# Patient Record
Sex: FEMALE | Race: WHITE
[De-identification: names, ages, dates, MRNs, and addresses within clinical notes are randomized per-mention and may not be internally consistent; named-entity substitution may affect disease eponyms.]

---

## 2019-06-15 ENCOUNTER — HOSPITAL ENCOUNTER (OUTPATIENT)
Dept: HOSPITAL 76 - DI | Age: 79
Discharge: HOME | End: 2019-06-15
Attending: PHYSICIAN ASSISTANT
Payer: MEDICARE

## 2019-06-15 DIAGNOSIS — R60.0: Primary | ICD-10-CM

## 2019-06-15 PROCEDURE — 93306 TTE W/DOPPLER COMPLETE: CPT

## 2019-06-27 ENCOUNTER — HOSPITAL ENCOUNTER (OUTPATIENT)
Dept: HOSPITAL 76 - DI | Age: 79
Discharge: HOME | End: 2019-06-27
Attending: PHYSICIAN ASSISTANT
Payer: MEDICARE

## 2019-06-27 DIAGNOSIS — J90: ICD-10-CM

## 2019-06-27 DIAGNOSIS — R06.09: Primary | ICD-10-CM

## 2019-06-27 PROCEDURE — 71046 X-RAY EXAM CHEST 2 VIEWS: CPT

## 2019-06-27 NOTE — XRAY REPORT
Reason:  DYSPNEA ON EXERTION

Procedure Date:  06/27/2019   

Accession Number:  [id number] / [id number]                    

Procedure:  XR  - Chest 2 View X-Ray CPT Code:  78372

 

FULL RESULT:

 

 

EXAM:

CHEST RADIOGRAPHY

 

EXAM DATE: 6/27/2019 11:38 AM.

 

CLINICAL HISTORY: Dyspnea on exertion.

 

COMPARISON: XR CHEST PA AND LAT 02/04/2013 12:41 PM.

 

TECHNIQUE: 2 views.

 

FINDINGS:

Lungs/Pleura: There are bilateral small pleural effusions, new. No 

pneumothorax. Visualized portions of the aerated lungs are clear with the 

exception of a posterior right lower lobe area of consolidation best seen 

on lateral view. A new 1.1 cm nodular opacity in the lower left lung near 

the cardiac apex potentially represents the patient's nipple.

 

Mediastinum: The cardiomediastinal contour is stable including 

calcifications of the aortic arch.

 

Other: Surgical clips in the left breast are again seen.

IMPRESSION: New bilateral small pleural effusions with airspace disease 

at the posterior right lung base.

 

Nodular opacity in the left lower lung is new, possibly the patient's 

nipple. This could be clarified on follow-up imaging with a nipple marker.

 

[facility]

## 2019-08-06 ENCOUNTER — HOSPITAL ENCOUNTER (OUTPATIENT)
Dept: HOSPITAL 76 - DI | Age: 79
Discharge: HOME | End: 2019-08-06
Attending: PHYSICIAN ASSISTANT
Payer: MEDICARE

## 2019-08-06 DIAGNOSIS — J90: Primary | ICD-10-CM

## 2019-08-06 PROCEDURE — 71046 X-RAY EXAM CHEST 2 VIEWS: CPT

## 2019-08-07 NOTE — XRAY REPORT
Reason:  COUGH

Procedure Date:  08/06/2019   

Accession Number:  [id number] / [id number]                    

Procedure:  XR  - Chest 2 View X-Ray CPT Code:  22358

 

FULL RESULT:

 

 

EXAM:

CHEST RADIOGRAPHY

 

EXAM DATE: 8/6/2019 03:54 PM.

 

CLINICAL HISTORY: Cough for 2 weeks.

 

COMPARISON: CHEST 2 VIEW 06/27/2019 11:30 AM, ABDOMEN/PELVIS W/ 

07/23/2015 3:57 PM.

 

TECHNIQUE: 2 views.

 

FINDINGS:

Lungs/Pleura: Redemonstration of small bilateral pleural effusions right 

greater than left which may have increased slightly. Lung volumes have 

decreased compared to 06/27/2019. Interstitial markings are coarsened and 

diaphragms appear flattened through the effusion; potential obstructive 

lung disease underlying which is not definitely demonstrated in the lung 

bases on the 2015 abdominal CT. The previously measured left lower lobe 

lung nodule is identified as a breast nipple on today's exam. Surgical 

clips are again seen projecting over the left lung base, possibly prior 

lumpectomy. Aerated airspace demonstrates no lobar consolidation or overt 

pulmonary edema.

 

Mediastinum: The cardiomediastinal silhouette is stable. Borderline 

cardiomegaly and calcified aortic arch.

 

Other: None.

IMPRESSION: Interval increase in bilateral pleural effusions.

 

[facility]

## 2019-10-09 ENCOUNTER — HOSPITAL ENCOUNTER (OUTPATIENT)
Dept: HOSPITAL 76 - DI | Age: 79
Discharge: HOME | End: 2019-10-09
Attending: NURSE PRACTITIONER
Payer: MEDICARE

## 2019-10-09 DIAGNOSIS — R18.8: Primary | ICD-10-CM

## 2019-10-09 PROCEDURE — 49083 ABD PARACENTESIS W/IMAGING: CPT

## 2019-10-19 NOTE — ULTRASOUND REPORT
Reason:  OTHER ASCITES

Procedure Date:  10/09/2019   

Accession Number:  [id number] / [id number]                    

Procedure:  US  - Abdominal Paracentesis CPT Code:  

 

FULL RESULT:

 

 

EXAM:

ULTRASOUND-GUIDED PARACENTESIS

 

EXAM DATE: 10/9/2019 10:34 AM.

 

CLINICAL HISTORY: Other ascites.

 

COMPARISON: None.

 

TECHNIQUE: Risks, benefits, and alternatives to the procedure were 

discussed with the patient. All questions answered. Written and verbal 

consent obtained. Patient was placed in the supine position and the skin 

overlying the ascites marked with sonographic guidance. The skin was 

sterilely prepped and draped, and 1% buffered lidocaine was used for 

local anesthesia. An 18-gauge Angiocath was advanced into the peritoneal 

ascites and fluid aspirated. Upon completion, the catheter was removed.

 

FINDINGS: A total of 3.2 L  of fluid was removed without immediate 

complication. Patient tolerated procedure well.

IMPRESSION: Ultrasound-guided paracentesis without immediate 

complications.

 

[facility]

## 2019-10-23 ENCOUNTER — HOSPITAL ENCOUNTER (OUTPATIENT)
Dept: HOSPITAL 76 - DI | Age: 79
Discharge: HOME | End: 2019-10-23
Attending: NURSE PRACTITIONER
Payer: MEDICARE

## 2019-10-23 DIAGNOSIS — R18.8: Primary | ICD-10-CM

## 2019-10-23 PROCEDURE — 49083 ABD PARACENTESIS W/IMAGING: CPT

## 2019-10-23 NOTE — ULTRASOUND REPORT
Reason:  OTHER ASCITES

Procedure Date:  10/23/2019   

Accession Number:  [id number] / [id number]                    

Procedure:  US  - Abdominal Paracentesis CPT Code:  

 

FULL RESULT:

 

 

EXAM:

ULTRASOUND-GUIDED PARACENTESIS

 

EXAM DATE: 10/23/2019 02:08 PM.

 

CLINICAL HISTORY: Ascites, history of hepatitis C and liver cancer.

 

COMPARISON: ABDOMINAL PARACENTESIS 10/09/2019 10:34 AM.

 

TECHNIQUE: Risks, benefits, and alternatives to the procedure were 

discussed with the patient. All questions answered. Written and verbal 

consent obtained. Patient was placed in the supine position and the skin 

overlying the left lower quadrant ascites marked with sonographic 

guidance. The skin was sterilely prepped and draped, and 1% buffered 

lidocaine was used for local anesthesia. An 18-gauge Safe-T-Centesis 

Catheter was advanced into the peritoneal ascites and fluid aspirated. 

Upon completion, the catheter was removed.

 

FINDINGS: A total of 3 L of fluid was removed without immediate 

complication. Patient tolerated procedure well.

IMPRESSION: Ultrasound-guided paracentesis without immediate 

complications.

 

[facility]

## 2019-11-06 ENCOUNTER — HOSPITAL ENCOUNTER (OUTPATIENT)
Dept: HOSPITAL 76 - DI | Age: 79
Discharge: HOME | End: 2019-11-06
Attending: NURSE PRACTITIONER
Payer: MEDICARE

## 2019-11-06 DIAGNOSIS — R18.8: Primary | ICD-10-CM

## 2019-11-06 LAB
ANION GAP SERPL CALCULATED.4IONS-SCNC: 10 MMOL/L (ref 6–13)
BUN SERPL-MCNC: 55 MG/DL (ref 6–20)
CALCIUM UR-MCNC: 9.9 MG/DL (ref 8.5–10.3)
CHLORIDE SERPL-SCNC: 102 MMOL/L (ref 101–111)
CO2 SERPL-SCNC: 21 MMOL/L (ref 21–32)
CREAT SERPLBLD-SCNC: 2 MG/DL (ref 0.4–1)
GFRSERPLBLD MDRD-ARVRAT: 24 ML/MIN/{1.73_M2} (ref 89–?)
GLUCOSE SERPL-MCNC: 115 MG/DL (ref 70–100)
SODIUM SERPLBLD-SCNC: 133 MMOL/L (ref 135–145)

## 2019-11-06 PROCEDURE — 49083 ABD PARACENTESIS W/IMAGING: CPT

## 2019-11-06 PROCEDURE — 36415 COLL VENOUS BLD VENIPUNCTURE: CPT

## 2019-11-06 PROCEDURE — 80048 BASIC METABOLIC PNL TOTAL CA: CPT

## 2019-11-07 NOTE — ULTRASOUND REPORT
Reason:  OTHER ASCITES

Procedure Date:  11/06/2019   

Accession Number:  [id number] / [id number]                    

Procedure:  US  - Abdominal Paracentesis CPT Code:  

 

***Final Report***

 

 

FULL RESULT:

 

 

EXAM:

ULTRASOUND-GUIDED PARACENTESIS

 

EXAM DATE: 11/6/2019 01:20 PM.

 

CLINICAL HISTORY: Ascites.

 

COMPARISON: ABDOMINAL PARACENTESIS 10/23/2019 12:36 PM.

 

TECHNIQUE: Risks, benefits, and alternatives to the procedure were 

discussed with the patient. All questions answered. Written and verbal 

consent obtained. Patient was placed in the supine position and the skin 

overlying the ascites marked with sonographic guidance. The skin was 

sterilely prepped and draped, and 1% buffered lidocaine was used for 

local anesthesia. A 6-Tuvaluan drainer was advanced into the peritoneal 

ascites and fluid aspirated. Upon completion, the catheter was removed.

 

FINDINGS: A total of 4000 mL of fluid was removed without immediate 

complication. Patient tolerated procedure well.

IMPRESSION:

Ultrasound-guided paracentesis without immediate complications.

 

[facility]

## 2019-11-20 ENCOUNTER — HOSPITAL ENCOUNTER (OUTPATIENT)
Dept: HOSPITAL 76 - DI | Age: 79
Discharge: HOME | End: 2019-11-20
Attending: NURSE PRACTITIONER
Payer: MEDICARE

## 2019-11-20 DIAGNOSIS — K74.69: Primary | ICD-10-CM

## 2019-11-20 DIAGNOSIS — R18.8: ICD-10-CM

## 2019-11-20 LAB
ALBUMIN DIAFP-MCNC: 3.2 G/DL (ref 3.2–5.5)
ALBUMIN/GLOB SERPL: 0.8 {RATIO} (ref 1–2.2)
ALP SERPL-CCNC: 86 IU/L (ref 42–121)
ALT SERPL W P-5'-P-CCNC: 29 IU/L (ref 10–60)
ANION GAP SERPL CALCULATED.4IONS-SCNC: 9 MMOL/L (ref 6–13)
APTT PPP: 37.7 SECS (ref 24.9–33.3)
AST SERPL W P-5'-P-CCNC: 72 IU/L (ref 10–42)
BASOPHILS NFR BLD AUTO: 0 10^3/UL (ref 0–0.1)
BASOPHILS NFR BLD AUTO: 0.6 %
BILIRUB BLD-MCNC: 12.6 MG/DL (ref 0.2–1)
BUN SERPL-MCNC: 55 MG/DL (ref 6–20)
CALCIUM UR-MCNC: 10.4 MG/DL (ref 8.5–10.3)
CHLORIDE SERPL-SCNC: 102 MMOL/L (ref 101–111)
CO2 SERPL-SCNC: 20 MMOL/L (ref 21–32)
CREAT SERPLBLD-SCNC: 2.4 MG/DL (ref 0.4–1)
EOSINOPHIL # BLD AUTO: 0.1 10^3/UL (ref 0–0.7)
EOSINOPHIL NFR BLD AUTO: 1.3 %
ERYTHROCYTE [DISTWIDTH] IN BLOOD BY AUTOMATED COUNT: 17.1 % (ref 12–15)
GFRSERPLBLD MDRD-ARVRAT: 19 ML/MIN/{1.73_M2} (ref 89–?)
GLOBULIN SER-MCNC: 3.8 G/DL (ref 2.1–4.2)
GLUCOSE SERPL-MCNC: 117 MG/DL (ref 70–100)
HGB UR QL STRIP: 9.2 G/DL (ref 12–16)
INR PPP: 1.7 (ref 0.8–1.2)
LYMPHOCYTES # SPEC AUTO: 0.4 10^3/UL (ref 1.5–3.5)
LYMPHOCYTES NFR BLD AUTO: 5.7 %
MCH RBC QN AUTO: 39.3 PG (ref 27–31)
MCHC RBC AUTO-ENTMCNC: 33.9 G/DL (ref 32–36)
MCV RBC AUTO: 115.8 FL (ref 81–99)
MONOCYTES # BLD AUTO: 0.8 10^3/UL (ref 0–1)
MONOCYTES NFR BLD AUTO: 11.4 %
NEUTROPHILS # BLD AUTO: 5.6 10^3/UL (ref 1.5–6.6)
NEUTROPHILS # SNV AUTO: 7 X10^3/UL (ref 4.8–10.8)
NEUTROPHILS NFR BLD AUTO: 80.4 %
PDW BLD AUTO: 11.4 FL (ref 7.9–10.8)
PLATELET # BLD: 91 10^3/UL (ref 130–450)
PROT SPEC-MCNC: 7 G/DL (ref 6.7–8.2)
PROTHROM ACT/NOR PPP: 18.3 SECS (ref 9.9–12.6)
RBC MAR: 2.34 10^6/UL (ref 4.2–5.4)
SODIUM SERPLBLD-SCNC: 131 MMOL/L (ref 135–145)

## 2019-11-20 PROCEDURE — 80048 BASIC METABOLIC PNL TOTAL CA: CPT

## 2019-11-20 PROCEDURE — 85025 COMPLETE CBC W/AUTO DIFF WBC: CPT

## 2019-11-20 PROCEDURE — 85610 PROTHROMBIN TIME: CPT

## 2019-11-20 PROCEDURE — 85730 THROMBOPLASTIN TIME PARTIAL: CPT

## 2019-11-20 PROCEDURE — 36415 COLL VENOUS BLD VENIPUNCTURE: CPT

## 2019-11-20 PROCEDURE — 49083 ABD PARACENTESIS W/IMAGING: CPT

## 2019-11-20 PROCEDURE — 80053 COMPREHEN METABOLIC PANEL: CPT

## 2019-11-21 NOTE — ULTRASOUND REPORT
Reason:  OTHER ASCITES

Procedure Date:  11/20/2019   

Accession Number:  [id number] / [id number]                    

Procedure:  US  - Abdominal Paracentesis CPT Code:  

 

***Final Report***

 

 

FULL RESULT:

 

 

EXAM:

ULTRASOUND-GUIDED PARACENTESIS

 

EXAM DATE: 11/20/2019 01:45 PM.

 

CLINICAL HISTORY: Other ascites.

 

COMPARISON: ABDOMINAL PARACENTESIS 11/06/2019 11:25 AM.

 

TECHNIQUE: Risks, benefits, and alternatives to the procedure were 

discussed with the patient. All questions answered. Written and verbal 

consent obtained. Patient was placed in the supine position and the skin 

overlying the ascites marked with sonographic guidance. The skin was 

sterilely prepped and draped, and 1% buffered lidocaine was used for 

local anesthesia. A 6 Persian drainer catheter was advanced into the 

peritoneal ascites and fluid aspirated. Upon completion, the catheter was 

removed.

 

FINDINGS: A total of 3100 mL of fluid was removed without immediate 

complication. Patient tolerated procedure well.

IMPRESSION: Ultrasound-guided paracentesis without immediate 

complications.

 

[facility]

## 2019-11-26 ENCOUNTER — HOSPITAL ENCOUNTER (OUTPATIENT)
Dept: HOSPITAL 76 - DI | Age: 79
Discharge: HOME | End: 2019-11-26
Attending: NURSE PRACTITIONER
Payer: MEDICARE

## 2019-11-26 DIAGNOSIS — R18.8: Primary | ICD-10-CM

## 2019-11-26 PROCEDURE — 49083 ABD PARACENTESIS W/IMAGING: CPT

## 2019-11-26 NOTE — ULTRASOUND REPORT
Reason:  OTHER ASCITES

Procedure Date:  11/26/2019   

Accession Number:  [id number] / [id number]                    

Procedure:  US  - Abdominal Paracentesis CPT Code:  

 

***Final Report***

 

 

FULL RESULT:

 

 

EXAM:

ULTRASOUND-GUIDED PARACENTESIS

 

EXAM DATE: 11/26/2019 02:17 PM.

 

CLINICAL HISTORY: Other ascites.

 

COMPARISON: ABDOMINAL PARACENTESIS 11/20/2019 11:44 AM.

 

TECHNIQUE: Risks, benefits, and alternatives to the procedure were 

discussed with the patient. All questions answered. Written and verbal 

consent obtained. Patient was placed in the supine position and the skin 

overlying the ascites marked with sonographic guidance. The skin was 

sterilely prepped and draped, and 1% buffered lidocaine was used for 

local anesthesia. A 4 Spanish needle catheter combination was advanced 

into the peritoneal ascites and fluid aspirated. Upon completion, the 

catheter was removed.

 

FINDINGS: A total of 4000 mL of fluid was removed without immediate 

complication. Patient tolerated procedure well.

IMPRESSION: Ultrasound-guided paracentesis without immediate 

complications.

 

[facility]

## 2019-12-04 ENCOUNTER — HOSPITAL ENCOUNTER (OUTPATIENT)
Dept: HOSPITAL 76 - LAB | Age: 79
Discharge: HOME | End: 2019-12-04
Attending: PHYSICIAN ASSISTANT
Payer: MEDICARE

## 2019-12-04 ENCOUNTER — HOSPITAL ENCOUNTER (OUTPATIENT)
Dept: HOSPITAL 76 - DI | Age: 79
Discharge: HOME | End: 2019-12-04
Attending: NURSE PRACTITIONER
Payer: MEDICARE

## 2019-12-04 DIAGNOSIS — C22.0: Primary | ICD-10-CM

## 2019-12-04 DIAGNOSIS — R18.8: Primary | ICD-10-CM

## 2019-12-04 LAB
ALBUMIN DIAFP-MCNC: 5 G/DL (ref 3.2–5.5)
ALBUMIN/GLOB SERPL: 2.3 {RATIO} (ref 1–2.2)
ALP SERPL-CCNC: 92 IU/L (ref 42–121)
ALT SERPL W P-5'-P-CCNC: 24 IU/L (ref 10–60)
ANION GAP SERPL CALCULATED.4IONS-SCNC: 14 MMOL/L (ref 6–13)
AST SERPL W P-5'-P-CCNC: 66 IU/L (ref 10–42)
BASOPHILS NFR BLD AUTO: 0 10^3/UL (ref 0–0.1)
BASOPHILS NFR BLD AUTO: 0.6 %
BILIRUB BLD-MCNC: 8.8 MG/DL (ref 0.2–1)
BUN SERPL-MCNC: 56 MG/DL (ref 6–20)
CALCIUM UR-MCNC: 10.6 MG/DL (ref 8.5–10.3)
CHLORIDE SERPL-SCNC: 99 MMOL/L (ref 101–111)
CO2 SERPL-SCNC: 17 MMOL/L (ref 21–32)
CREAT SERPLBLD-SCNC: 2.4 MG/DL (ref 0.4–1)
EOSINOPHIL # BLD AUTO: 0.1 10^3/UL (ref 0–0.7)
EOSINOPHIL NFR BLD AUTO: 0.9 %
ERYTHROCYTE [DISTWIDTH] IN BLOOD BY AUTOMATED COUNT: 17.4 % (ref 12–15)
GFRSERPLBLD MDRD-ARVRAT: 19 ML/MIN/{1.73_M2} (ref 89–?)
GLOBULIN SER-MCNC: 2.2 G/DL (ref 2.1–4.2)
GLUCOSE SERPL-MCNC: 134 MG/DL (ref 70–100)
HGB UR QL STRIP: 7 G/DL (ref 12–16)
INR PPP: 2 (ref 0.8–1.2)
LYMPHOCYTES # SPEC AUTO: 0.4 10^3/UL (ref 1.5–3.5)
LYMPHOCYTES NFR BLD AUTO: 6.7 %
MCH RBC QN AUTO: 37.6 PG (ref 27–31)
MCHC RBC AUTO-ENTMCNC: 32.7 G/DL (ref 32–36)
MCV RBC AUTO: 115.1 FL (ref 81–99)
MONOCYTES # BLD AUTO: 0.7 10^3/UL (ref 0–1)
MONOCYTES NFR BLD AUTO: 13.7 %
NEUTROPHILS # BLD AUTO: 4.2 10^3/UL (ref 1.5–6.6)
NEUTROPHILS # SNV AUTO: 5.4 X10^3/UL (ref 4.8–10.8)
NEUTROPHILS NFR BLD AUTO: 77.4 %
PDW BLD AUTO: 11.2 FL (ref 7.9–10.8)
PLAT MORPH BLD: (no result)
PLATELET # BLD: 61 10^3/UL (ref 130–450)
PLATELET BLD QL SMEAR: (no result)
PROT SPEC-MCNC: 7.2 G/DL (ref 6.7–8.2)
PROTHROM ACT/NOR PPP: 22.1 SECS (ref 9.9–12.6)
RBC MAR: 1.86 10^6/UL (ref 4.2–5.4)
RBC MORPH BLD: (no result)
SODIUM SERPLBLD-SCNC: 130 MMOL/L (ref 135–145)

## 2019-12-04 PROCEDURE — 80053 COMPREHEN METABOLIC PANEL: CPT

## 2019-12-04 PROCEDURE — 49083 ABD PARACENTESIS W/IMAGING: CPT

## 2019-12-04 PROCEDURE — 85025 COMPLETE CBC W/AUTO DIFF WBC: CPT

## 2019-12-04 PROCEDURE — 85610 PROTHROMBIN TIME: CPT

## 2019-12-04 PROCEDURE — 36415 COLL VENOUS BLD VENIPUNCTURE: CPT

## 2019-12-04 NOTE — ULTRASOUND REPORT
Reason:  OTHER ASCITES

Procedure Date:  12/04/2019   

Accession Number:  [id number] / [id number]                    

Procedure:  US  - Abdominal Paracentesis CPT Code:  

 

***Final Report***

 

 

FULL RESULT:

 

 

EXAM: Abdominal Paracentesis

 

DATE: 12/4/2019 1:35 PM

 

CLINICAL HISTORY: Ascites. Therapeutic drainage requested.

 

FINDINGS:

Following obtaining informed consent, a suitable site in the patient's 

abdomen was selected with ultrasound. The skin was prepped and draped in 

the usual sterile fashion. The skin and soft tissues were anesthetized 

with buffered lidocaine. A SafeTcentesis catheter was inserted into the 

peritoneal cavity, and approximately 20 mL of fluid was removed without 

difficulty.

 

Trace residual ascites present in the abdomen at the completion of the 

procedure.

 

The patient tolerated the procedure well. No immediate complications.

 

IMPRESSION:

Successful ultrasound-guided paracentesis, yielding approximately 20 mL 

of fluid.

## 2019-12-05 ENCOUNTER — HOSPITAL ENCOUNTER (EMERGENCY)
Dept: HOSPITAL 76 - ED | Age: 79
Discharge: HOME | End: 2019-12-05
Payer: MEDICARE

## 2019-12-05 VITALS — DIASTOLIC BLOOD PRESSURE: 51 MMHG | SYSTOLIC BLOOD PRESSURE: 109 MMHG

## 2019-12-05 DIAGNOSIS — B19.20: ICD-10-CM

## 2019-12-05 DIAGNOSIS — I10: ICD-10-CM

## 2019-12-05 DIAGNOSIS — R01.1: ICD-10-CM

## 2019-12-05 DIAGNOSIS — E87.1: ICD-10-CM

## 2019-12-05 DIAGNOSIS — E87.5: Primary | ICD-10-CM

## 2019-12-05 DIAGNOSIS — R06.02: ICD-10-CM

## 2019-12-05 DIAGNOSIS — D64.9: ICD-10-CM

## 2019-12-05 DIAGNOSIS — K76.7: ICD-10-CM

## 2019-12-05 DIAGNOSIS — K74.69: ICD-10-CM

## 2019-12-05 LAB
ALBUMIN DIAFP-MCNC: 4.5 G/DL (ref 3.2–5.5)
ALBUMIN/GLOB SERPL: 1.7 {RATIO} (ref 1–2.2)
ALP SERPL-CCNC: 105 IU/L (ref 42–121)
ALT SERPL W P-5'-P-CCNC: 27 IU/L (ref 10–60)
ANION GAP SERPL CALCULATED.4IONS-SCNC: 14 MMOL/L (ref 6–13)
AST SERPL W P-5'-P-CCNC: 75 IU/L (ref 10–42)
BASOPHILS NFR BLD AUTO: 0 10^3/UL (ref 0–0.1)
BASOPHILS NFR BLD AUTO: 0.6 %
BILIRUB BLD-MCNC: 10.8 MG/DL (ref 0.2–1)
BUN SERPL-MCNC: 56 MG/DL (ref 6–20)
CALCIUM UR-MCNC: 10.7 MG/DL (ref 8.5–10.3)
CHLORIDE SERPL-SCNC: 99 MMOL/L (ref 101–111)
CO2 SERPL-SCNC: 18 MMOL/L (ref 21–32)
CREAT SERPLBLD-SCNC: 2.3 MG/DL (ref 0.4–1)
EOSINOPHIL # BLD AUTO: 0.1 10^3/UL (ref 0–0.7)
EOSINOPHIL NFR BLD AUTO: 1.4 %
ERYTHROCYTE [DISTWIDTH] IN BLOOD BY AUTOMATED COUNT: 17.5 % (ref 12–15)
GFRSERPLBLD MDRD-ARVRAT: 20 ML/MIN/{1.73_M2} (ref 89–?)
GLOBULIN SER-MCNC: 2.6 G/DL (ref 2.1–4.2)
GLUCOSE SERPL-MCNC: 108 MG/DL (ref 70–100)
HGB UR QL STRIP: 8.2 G/DL (ref 12–16)
LIPASE SERPL-CCNC: 50 U/L (ref 22–51)
LYMPHOCYTES # SPEC AUTO: 0.4 10^3/UL (ref 1.5–3.5)
LYMPHOCYTES NFR BLD AUTO: 5.8 %
MAGNESIUM SERPL-MCNC: 2.4 MG/DL (ref 1.7–2.8)
MCH RBC QN AUTO: 38.1 PG (ref 27–31)
MCHC RBC AUTO-ENTMCNC: 33.5 G/DL (ref 32–36)
MCV RBC AUTO: 114 FL (ref 81–99)
MONOCYTES # BLD AUTO: 0.7 10^3/UL (ref 0–1)
MONOCYTES NFR BLD AUTO: 10.7 %
NEUTROPHILS # BLD AUTO: 5.3 10^3/UL (ref 1.5–6.6)
NEUTROPHILS # SNV AUTO: 6.6 X10^3/UL (ref 4.8–10.8)
NEUTROPHILS NFR BLD AUTO: 81 %
PDW BLD AUTO: 12.1 FL (ref 7.9–10.8)
PLATELET # BLD: 75 10^3/UL (ref 130–450)
PROT SPEC-MCNC: 7.1 G/DL (ref 6.7–8.2)
RBC MAR: 2.15 10^6/UL (ref 4.2–5.4)
SODIUM SERPLBLD-SCNC: 131 MMOL/L (ref 135–145)

## 2019-12-05 PROCEDURE — 83690 ASSAY OF LIPASE: CPT

## 2019-12-05 PROCEDURE — 36415 COLL VENOUS BLD VENIPUNCTURE: CPT

## 2019-12-05 PROCEDURE — 86900 BLOOD TYPING SEROLOGIC ABO: CPT

## 2019-12-05 PROCEDURE — 82272 OCCULT BLD FECES 1-3 TESTS: CPT

## 2019-12-05 PROCEDURE — 86850 RBC ANTIBODY SCREEN: CPT

## 2019-12-05 PROCEDURE — 80053 COMPREHEN METABOLIC PANEL: CPT

## 2019-12-05 PROCEDURE — 96374 THER/PROPH/DIAG INJ IV PUSH: CPT

## 2019-12-05 PROCEDURE — 99285 EMERGENCY DEPT VISIT HI MDM: CPT

## 2019-12-05 PROCEDURE — 85025 COMPLETE CBC W/AUTO DIFF WBC: CPT

## 2019-12-05 PROCEDURE — 83735 ASSAY OF MAGNESIUM: CPT

## 2019-12-05 PROCEDURE — 86901 BLOOD TYPING SEROLOGIC RH(D): CPT

## 2019-12-05 PROCEDURE — 36430 TRANSFUSION BLD/BLD COMPNT: CPT

## 2019-12-05 PROCEDURE — 99284 EMERGENCY DEPT VISIT MOD MDM: CPT

## 2019-12-05 PROCEDURE — 83880 ASSAY OF NATRIURETIC PEPTIDE: CPT

## 2019-12-05 PROCEDURE — 86920 COMPATIBILITY TEST SPIN: CPT

## 2019-12-05 NOTE — ED PHYSICIAN DOCUMENTATION
History of Present Illness





- Stated complaint


Stated Complaint: WEAKNESS





- Chief complaint


Chief Complaint: General





- History obtained from


History obtained from: Patient, Family





- History of Present Illness


Timing: Other (She has longstanding cirrhosis from hepatitis C that she received

work during a blood transfusion when she was 29.  She gets weekly paracenteses. 

She had routine blood work done yesterday showing new anemia.  She feels weak.  

She got her paracentesis yesterday and she usually feels better after the 

paracentesis from her perspective of weakness and shortness of breath, but still

feels very weak and tired despite getting her paracentesis yesterday.  She 

denies dark or tarry stools but says she does not really look at her stool.)





Review of Systems


Constitutional: reports: Fatigue.  denies: Fever, Chills


Nose: denies: Rhinorrhea / runny nose


Cardiac: denies: Chest pain / pressure, Palpitations


Respiratory: reports: Dyspnea.  denies: Cough


GI: reports: Constipation (mild).  denies: Abdominal Pain, Nausea, Vomiting, 

Diarrhea





PD PAST MEDICAL HISTORY





- Past Medical History


Cardiovascular: Hypertension


Respiratory: Other


Endocrine/Autoimmune: None


GI: Hepatitis, Cirrhosis, Cholelithiasis


GYN: Breast cancer


: None


Musculoskeletal: Fatigue


Derm: None





- Past Surgical History


Past Surgical History: Yes


Ortho: Hip replacement





- Allergies


Allergies/Adverse Reactions: 


                                    Allergies











Allergy/AdvReac Type Severity Reaction Status Date / Time


 


No Known Drug Allergies Allergy   Verified 12/05/19 11:20














- Social History


Does the pt smoke?: No


Smoking Status: Never smoker


Does the pt drink ETOH?: No


Does the pt have substance abuse?: No





- Immunizations


Immunizations are current?: Yes





PD ED PE NORMAL





- Vitals


Vital signs reviewed: Yes





- General


General: Alert and oriented X 3, Other (Jaundiced thin appearing lady in no 

distress)





- HEENT


HEENT: PERRL, EOMI





- Neck


Neck: Supple, no meningeal sign, No bony TTP





- Cardiac


Cardiac: Other (2 out of 6 decrescendo systolic murmur heard best at the left 

upper sternal border)





- Respiratory


Respiratory: No respiratory distress, Clear bilaterally





- Abdomen


Abdomen: Other (Mild ascites, nontender)





- Rectal


Rectal: Other (Done with Lizzeth HOFF present and chaperoning, small amount of 

brown stool without obvious melena in the vault.  Sent for guaiac.)





- Back


Back: No CVA TTP





- Derm


Derm: Normal color, Warm and dry





- Neuro


Neuro: Alert and oriented X 3, Normal speech





Results





- Vitals


Vitals: 


                               Vital Signs - 24 hr











  12/05/19 12/05/19 12/05/19





  11:21 13:23 13:30


 


Temperature 36.6 C  


 


Heart Rate 80 78 78


 


Respiratory 20 18 18





Rate   


 


Blood Pressure 129/49 L 115/44 L 115/44 L


 


O2 Saturation 100 100 100














  12/05/19





  13:55


 


Temperature 36.5 C


 


Heart Rate 80


 


Respiratory 18





Rate 


 


Blood Pressure 110/45 L


 


O2 Saturation 








                                     Oxygen











O2 Source                      Room air

















- Labs


Labs: 


                                  Microbiology











 12/05/19 12:23 Occult Blood - Final





 Stool 








                                Laboratory Tests











  12/05/19 12/05/19 12/05/19





  11:53 11:53 11:53


 


WBC  6.6  


 


RBC  2.15 L  


 


Hgb  8.2 L  


 


Hct  24.5 L  


 


MCV  114.0 H  


 


MCH  38.1 H  


 


MCHC  33.5  


 


RDW  17.5 H  


 


Plt Count  75 L  


 


MPV  12.1 H  


 


Neut # (Auto)  5.3  


 


Lymph # (Auto)  0.4 L  


 


Mono # (Auto)  0.7  


 


Eos # (Auto)  0.1  


 


Baso # (Auto)  0.0  


 


Absolute Nucleated RBC  0.00  


 


Nucleated RBC %  0.0  


 


Sodium   131 L 


 


Potassium   6.2 H* 


 


Chloride   99 L 


 


Carbon Dioxide   18 L 


 


Anion Gap   14.0 H 


 


BUN   56 H 


 


Creatinine   2.3 H 


 


Estimated GFR (MDRD)   20 L 


 


Glucose   108 H 


 


Calcium   10.7 H 


 


Magnesium   2.4 


 


Total Bilirubin   10.8 H 


 


AST   75 H 


 


ALT   27 


 


Alkaline Phosphatase   105 


 


B-Natriuretic Peptide    3895 H


 


Total Protein   7.1 


 


Albumin   4.5 


 


Globulin   2.6 


 


Albumin/Globulin Ratio   1.7 


 


Lipase   50 


 


Blood Type   


 


Antibody Screen   


 


Crossmatch IS Only   














  12/05/19





  12:17


 


WBC 


 


RBC 


 


Hgb 


 


Hct 


 


MCV 


 


MCH 


 


MCHC 


 


RDW 


 


Plt Count 


 


MPV 


 


Neut # (Auto) 


 


Lymph # (Auto) 


 


Mono # (Auto) 


 


Eos # (Auto) 


 


Baso # (Auto) 


 


Absolute Nucleated RBC 


 


Nucleated RBC % 


 


Sodium 


 


Potassium 


 


Chloride 


 


Carbon Dioxide 


 


Anion Gap 


 


BUN 


 


Creatinine 


 


Estimated GFR (MDRD) 


 


Glucose 


 


Calcium 


 


Magnesium 


 


Total Bilirubin 


 


AST 


 


ALT 


 


Alkaline Phosphatase 


 


B-Natriuretic Peptide 


 


Total Protein 


 


Albumin 


 


Globulin 


 


Albumin/Globulin Ratio 


 


Lipase 


 


Blood Type  O NEGATIVE


 


Antibody Screen  NEGATIVE


 


Crossmatch IS Only  See Detail














PD MEDICAL DECISION MAKING





- ED course


ED course: 





This is an 79-year-old woman presents with symptomatic anemia in the setting of 

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  I was called by 

her physician, Dolores Abraham who also relayed the social situation and that she

is trying to get her into hospice.  We agreed since she is symptomatic from her 

anemia that we would give her 1 unit of blood and a dose of diuretics.  Note she

was in tolerated of furosemide in the past and so Bumex was given.  This should 

help her hyponatremia, hyperkalemia, and symptomatic dyspnea from anemia.  She 

will touch base with the patient tomorrow as she is still trying to get her into

hospice.





Departure





- Departure


Disposition: 01 Home, Self Care


Clinical Impression: 


 Hyperkalemia





Anemia


Qualifiers:


 Anemia type: unspecified type Qualified Code(s): D64.9 - Anemia, unspecified





Dyspnea


Qualifiers:


 Dyspnea type: shortness of breath Qualified Code(s): R06.02 - Shortness of 

breath





Condition: Good


Record reviewed to determine appropriate education?: Yes


Instructions:  Diet Low Potassium Dc, Hospice


Comments: 


Today you were seen for symptomatic anemia related to your ongoing liver and s

ubsequently renal disease.  


For this you were given 1 unit of blood.  You also had a high potassium level, 

the diuretic should help with this.  You should eat a low potassium diet, 

instructions are attached.  Return anytime for new or worsening symptoms.  Dolores Abraham should be reaching out to you tomorrow to see how you are doing, she is

encouraging you to consider hospice.

## 2019-12-11 ENCOUNTER — HOSPITAL ENCOUNTER (OUTPATIENT)
Dept: HOSPITAL 76 - DI | Age: 79
Discharge: HOME | End: 2019-12-11
Attending: NURSE PRACTITIONER
Payer: MEDICARE

## 2019-12-11 DIAGNOSIS — R18.8: Primary | ICD-10-CM

## 2019-12-11 PROCEDURE — 49083 ABD PARACENTESIS W/IMAGING: CPT

## 2019-12-12 NOTE — ULTRASOUND REPORT
Reason:  OTHER ASCITES

Procedure Date:  12/11/2019   

Accession Number:  [id number] / [id number]                    

Procedure:  US  - Abdominal Paracentesis CPT Code:  

 

***Final Report***

 

 

FULL RESULT:

 

 

EXAM:

ULTRASOUND-GUIDED PARACENTESIS

 

EXAM DATE: 12/11/2019 02:49 PM.

 

CLINICAL HISTORY: Ascites. Paracentesis.

 

COMPARISON: ABDOMINAL PARACENTESIS 12/04/2019 11:37 AM.

 

TECHNIQUE: Risks, benefits, and alternatives to the procedure were 

discussed with the patient. All questions answered. Written and verbal 

consent obtained. Patient was placed in the supine position and the skin 

overlying the ascites marked with sonographic guidance. The skin was 

sterilely prepped and draped, and 1% buffered lidocaine was used for 

local anesthesia. A 6 Kiswahili drain or catheter was advanced into the 

peritoneal ascites and fluid aspirated. Upon completion, the catheter was 

removed.

 

FINDINGS: A total of 3000 mL of fluid was removed without immediate 

complication. Patient tolerated procedure well.

IMPRESSION: Ultrasound-guided paracentesis without immediate 

complications.

 

[facility]

## 2019-12-14 ENCOUNTER — HOSPITAL ENCOUNTER (OUTPATIENT)
Dept: HOSPITAL 76 - EMS | Age: 79
Discharge: ANOTHER HOSPITAL | End: 2019-12-14
Attending: SURGERY
Payer: MEDICARE

## 2019-12-14 ENCOUNTER — HOSPITAL ENCOUNTER (OUTPATIENT)
Dept: HOSPITAL 76 - ED | Age: 79
Setting detail: OBSERVATION
End: 2019-12-14
Attending: INTERNAL MEDICINE | Admitting: SPECIALIST
Payer: MEDICARE

## 2019-12-14 VITALS — DIASTOLIC BLOOD PRESSURE: 57 MMHG | SYSTOLIC BLOOD PRESSURE: 95 MMHG

## 2019-12-14 DIAGNOSIS — R57.0: ICD-10-CM

## 2019-12-14 DIAGNOSIS — Z85.3: ICD-10-CM

## 2019-12-14 DIAGNOSIS — R41.0: ICD-10-CM

## 2019-12-14 DIAGNOSIS — Z66: ICD-10-CM

## 2019-12-14 DIAGNOSIS — Z51.5: ICD-10-CM

## 2019-12-14 DIAGNOSIS — R03.1: ICD-10-CM

## 2019-12-14 DIAGNOSIS — I12.9: ICD-10-CM

## 2019-12-14 DIAGNOSIS — R19.7: ICD-10-CM

## 2019-12-14 DIAGNOSIS — N17.0: ICD-10-CM
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
## 2019-12-14 DIAGNOSIS — Z91.81: ICD-10-CM

## 2019-12-14 DIAGNOSIS — K74.60: ICD-10-CM

## 2019-12-14 DIAGNOSIS — N18.3: ICD-10-CM

## 2019-12-14 DIAGNOSIS — K72.90: ICD-10-CM
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
## 2019-12-14 DIAGNOSIS — D64.9: ICD-10-CM

## 2019-12-14 DIAGNOSIS — T68.XXXA: ICD-10-CM

## 2019-12-14 DIAGNOSIS — C22.0: Primary | ICD-10-CM

## 2019-12-14 DIAGNOSIS — B19.20: ICD-10-CM

## 2019-12-14 DIAGNOSIS — R53.1: Primary | ICD-10-CM

## 2019-12-14 DIAGNOSIS — R47.9: ICD-10-CM

## 2019-12-14 DIAGNOSIS — R18.8: ICD-10-CM

## 2019-12-14 DIAGNOSIS — E87.2: ICD-10-CM

## 2019-12-14 LAB
ALBUMIN DIAFP-MCNC: 3 G/DL (ref 3.2–5.5)
ALBUMIN/GLOB SERPL: 1.3 {RATIO} (ref 1–2.2)
ALP SERPL-CCNC: 54 IU/L (ref 42–121)
ALT SERPL W P-5'-P-CCNC: 24 IU/L (ref 10–60)
ANION GAP SERPL CALCULATED.4IONS-SCNC: 23 MMOL/L (ref 6–13)
AST SERPL W P-5'-P-CCNC: 73 IU/L (ref 10–42)
BASOPHILS NFR BLD AUTO: 0 10^3/UL (ref 0–0.1)
BASOPHILS NFR BLD AUTO: 0.2 %
BILIRUB BLD-MCNC: 16.7 MG/DL (ref 0.2–1)
BUN SERPL-MCNC: 80 MG/DL (ref 6–20)
CALCIUM UR-MCNC: 9.8 MG/DL (ref 8.5–10.3)
CHLORIDE SERPL-SCNC: 105 MMOL/L (ref 101–111)
CO2 SERPL-SCNC: 9 MMOL/L (ref 21–32)
CREAT SERPLBLD-SCNC: 2.9 MG/DL (ref 0.4–1)
EOSINOPHIL # BLD AUTO: 0 10^3/UL (ref 0–0.7)
EOSINOPHIL NFR BLD AUTO: 0.1 %
ERYTHROCYTE [DISTWIDTH] IN BLOOD BY AUTOMATED COUNT: 19.4 % (ref 12–15)
GFRSERPLBLD MDRD-ARVRAT: 16 ML/MIN/{1.73_M2} (ref 89–?)
GLOBULIN SER-MCNC: 2.3 G/DL (ref 2.1–4.2)
GLUCOSE SERPL-MCNC: 85 MG/DL (ref 70–100)
HGB UR QL STRIP: 6.4 G/DL (ref 12–16)
INR PPP: 2.8 (ref 0.8–1.2)
LIPASE SERPL-CCNC: 24 U/L (ref 22–51)
LYMPHOCYTES # SPEC AUTO: 0.4 10^3/UL (ref 1.5–3.5)
LYMPHOCYTES NFR BLD AUTO: 3.7 %
MCH RBC QN AUTO: 37.9 PG (ref 27–31)
MCHC RBC AUTO-ENTMCNC: 30.9 G/DL (ref 32–36)
MCV RBC AUTO: 122.5 FL (ref 81–99)
MONOCYTES # BLD AUTO: 0.7 10^3/UL (ref 0–1)
MONOCYTES NFR BLD AUTO: 6.1 %
NEUTROPHILS # BLD AUTO: 9.3 10^3/UL (ref 1.5–6.6)
NEUTROPHILS # SNV AUTO: 10.6 X10^3/UL (ref 4.8–10.8)
NEUTROPHILS NFR BLD AUTO: 87.5 %
PDW BLD AUTO: 12.2 FL (ref 7.9–10.8)
PLAT MORPH BLD: (no result)
PLATELET # BLD: 83 10^3/UL (ref 130–450)
PLATELET BLD QL SMEAR: (no result)
PROT SPEC-MCNC: 5.3 G/DL (ref 6.7–8.2)
PROTHROM ACT/NOR PPP: 29.7 SECS (ref 9.9–12.6)
RBC MAR: 1.69 10^6/UL (ref 4.2–5.4)
RBC MORPH BLD: (no result)
SODIUM SERPLBLD-SCNC: 137 MMOL/L (ref 135–145)

## 2019-12-14 PROCEDURE — 96375 TX/PRO/DX INJ NEW DRUG ADDON: CPT

## 2019-12-14 PROCEDURE — 85610 PROTHROMBIN TIME: CPT

## 2019-12-14 PROCEDURE — 70450 CT HEAD/BRAIN W/O DYE: CPT

## 2019-12-14 PROCEDURE — 99285 EMERGENCY DEPT VISIT HI MDM: CPT

## 2019-12-14 PROCEDURE — 86870 RBC ANTIBODY IDENTIFICATION: CPT

## 2019-12-14 PROCEDURE — 86850 RBC ANTIBODY SCREEN: CPT

## 2019-12-14 PROCEDURE — 82140 ASSAY OF AMMONIA: CPT

## 2019-12-14 PROCEDURE — 86901 BLOOD TYPING SEROLOGIC RH(D): CPT

## 2019-12-14 PROCEDURE — 96365 THER/PROPH/DIAG IV INF INIT: CPT

## 2019-12-14 PROCEDURE — 86920 COMPATIBILITY TEST SPIN: CPT

## 2019-12-14 PROCEDURE — 71045 X-RAY EXAM CHEST 1 VIEW: CPT

## 2019-12-14 PROCEDURE — 96368 THER/DIAG CONCURRENT INF: CPT

## 2019-12-14 PROCEDURE — 80053 COMPREHEN METABOLIC PANEL: CPT

## 2019-12-14 PROCEDURE — 87040 BLOOD CULTURE FOR BACTERIA: CPT

## 2019-12-14 PROCEDURE — 84443 ASSAY THYROID STIM HORMONE: CPT

## 2019-12-14 PROCEDURE — 83605 ASSAY OF LACTIC ACID: CPT

## 2019-12-14 PROCEDURE — 36415 COLL VENOUS BLD VENIPUNCTURE: CPT

## 2019-12-14 PROCEDURE — 51701 INSERT BLADDER CATHETER: CPT

## 2019-12-14 PROCEDURE — 85025 COMPLETE CBC W/AUTO DIFF WBC: CPT

## 2019-12-14 PROCEDURE — 86900 BLOOD TYPING SEROLOGIC ABO: CPT

## 2019-12-14 PROCEDURE — 83690 ASSAY OF LIPASE: CPT

## 2019-12-14 PROCEDURE — 96376 TX/PRO/DX INJ SAME DRUG ADON: CPT

## 2019-12-14 NOTE — CT REPORT
Reason:  altered

Procedure Date:  12/14/2019   

Accession Number:  [id number] / [id number]                    

Procedure:  CT  - HEAD WO CPT Code:  

 

***Final Report***

 

 

FULL RESULT:

 

 

EXAM:

CT HEAD

 

EXAM DATE: 12/14/2019 06:27 PM.

 

CLINICAL HISTORY: 79-year-old female. Unwitnessed fall, found on the 

ground. Altered.

 

COMPARISON: None.

 

TECHNIQUE: Multiaxial CT images were obtained from the foramen magnum to 

the vertex. Reformats: Sagittal and coronal. IV contrast: None.

 

In accordance with CT protocol optimization, one or more of the following 

dose reduction techniques were utilized for this exam: automated exposure 

control, adjustment of mA and/or KV based on patient size, or use of 

iterative reconstructive technique.

 

FINDINGS:

Parenchyma: No intraparenchymal hemorrhage. No evidence of mass, midline 

shift, or CT findings of acute infarction. Gray-white differentiation is 

distinct. Diffuse chronic microangiopathic white matter changes are 

evident.

 

Extraaxial Spaces: Normal for age. No subdural or epidural collections 

identified.

 

Ventricles: The ventricles and cortical sulci are enlarged, consistent 

with age-related tissue loss.

 

Sinuses and orbits: Status post bilateral lens replacement surgery. 

Imaged paranasal sinuses, orbits, and mastoids show no significant 

abnormality.

 

Bones: No evidence of fracture or calvarial defect.

 

Other: Moderate atherosclerosis intracranial arteries.

IMPRESSION: Generalized age-related cortical atrophic changes without 

evidence of acute intracranial abnormality.

 

[facility]

## 2019-12-14 NOTE — HISTORY & PHYSICAL EXAMINATION
Chief Complaint





- Chief Complaint


Chief Complaint: Altered mental status after being found down on the floor today





History of Present Illness





- Admitted From


Admitted From:: Home/ER





- History Obtained From


Records Reviewed: [facility]


History obtained from: Dr. Merida and MINDY Love


Exam Limitations: her encephalopathy and weakness





- History of Present Illness


HPI Comment/Other: 


This is a truly unfortunate sad 79-year-old female who has been down on the 

floor for a few hours at home.  She received a hip surgery in her home country 

of [facility] 30 years ago.  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  Was

treated with interferon and ribavirin initially, and then eventually Harvoni.  

Disease progressed.  She was then found to have hepatocellular carcinoma about 3

years ago.  She has not responded to therapy and has been failing.





In addition, her  has stage IV lung cancer and is also dying.  She has 

been requesting comfort care, even transition to hospice.  However, her  

has been described as "a control freak".  And he would not allow her to 

transition to hospice.  She has been coming in to get paracentesis for comfort, 

and blood transfusions as needed.





She fell on the floor today.  In an effort to help her up he ended up falling on

the floor as well.  They lay on the floor together for several hours until he 

can finally get to a phone and call EMS.





She was evaluated in the emergency room and was found to have a temperature of 

33, blood pressure of 99/60, 26 respiratory rates.  Her systolic is drifted down

to the high 40s and low 50s.  Levophed was started.  However, her primary care 

provider is present at the bedside.  Patient really does not want aggressive 

measures.  She really wants comfort measures only.   is now in the bed 

next to her being evaluated.





Her potassium is 5.9, carbon dioxide 9, lactic acid greater than 10, BUN 80, 

creatinine 2.9.  On December 5 her BUN is 56 and creatinine 2.3.  Her ammonia 

level is 94.  Hemoglobin is back down to 6.4.  She had received 1 unit on 

December 5 and her hemoglobin had gone up to 8.2.  Platelets are 83.  She is 

barely responsive,








History





- Past Medical History


Cardiovascular: reports: Hypertension


Respiratory: reports: Other


Endocrine/Autoimmune: reports: None


GI: reports: Hepatitis, Cirrhosis, Cholelithiasis


GYN: reports: Breast cancer


: reports: None


Musculoskeletal: reports: Fatigue


Derm: reports: None


MRSA Hx?: No





- Past Surgical History


Ortho: reports: Hip replacement





- Family & Social History


Living arrangement: At home


Living Situation: With spouse/s.o.





- Substance History


Use: Uses substance without health or social issues: NONE


Abuse: Recurrent use of substance despite neg consequences: NONE


Dependence: Experiences withdrawal or developed tolerances: NONE





- POLST


Patient has POLST: No


POLST Status: DNR





Meds/Allgy





- Allergies


Allergies/Adverse Reactions: 


                                    Allergies











Allergy/AdvReac Type Severity Reaction Status Date / Time


 


No Known Drug Allergies Allergy   Verified 12/05/19 11:20














Review of Systems





- Other Findings


Other Findings: 


At this time this fede lady is moderately encephalopathic, barely responsive, 

unable to obtain review of systems





Prior Level of Functionality: 


Barely able to dress herself or feed herself over the last couple of weeks.  Is 

relying on her  who is also dying of stage IV metastatic lung cancer for 

food, transportation and care








Exam





- Vital Signs


Reviewed Vital Signs: Yes


Vital Signs: 





                                Vital Signs x48h











  Temp Pulse Resp BP Pulse Ox


 


 12/14/19 18:32  33.7 C L  93  22  95/57 L  100


 


 12/14/19 17:58  33.4 C L  90  23  110/37 L  100


 


 12/14/19 17:45  33.3 C L  87  26 H  96/34 L  100


 


 12/14/19 17:30  33.2 C L  86  26 H  92/32 L  96


 


 12/14/19 17:26  33.2 C L  86  27 H  87/30 L  93


 


 12/14/19 17:21  33.2 C L  83  26 H  87/30 L  100


 


 12/14/19 17:00     71/29 L 


 


 12/14/19 16:49  33.2 C L  84  29 H  88/48 L  100


 


 12/14/19 16:32   84  33 H  96/46 L  100


 


 12/14/19 16:30  33.1 C L    


 


 12/14/19 16:25   86  28 H  99/60  100


 


 12/14/19 16:12  33.0 C L  87  26 H  99/60  100














- Physical Exam


General Appearance: positive: Lethargic, Other (4 foot 11 inch female who is v

chelsi tiny, weighs 45.9 kg, responsive to my voice, with a faint, weak voice 

herself)


Eyes Bilateral: positive: PERRL, EOMI, Other (bilateral scleral icterus)


ENT: positive: Dry mucous membranes (very very dry)


Neck: positive: No JVD.  negative: Stiff neck


Respiratory: positive: Chest non-tender, No respiratory distress, Other (Shallow

slow unlabored respiration).  negative: Wheezes, Rales, Rhonchi


Cardiovascular: positive: Regular rate & rhythm, Systolic murmur.  negative: 

Gallop/S4, Friction rub


Abdomen: positive: Other (Distended and rounded, generalized tenderness, 

positive fluid wave, positive hepatomegaly.  She winces with palpation but no 

rebound or guarding.  Hypoactive bowel sounds.)


Skin: positive: Dry, Other (Cold to touch)


Extremities: positive: Non-tender, Other (Anasarca)


Neurologic/Psychiatric: positive: Other (Oriented to person.  Recognizes her pro

vider.  Recognizes that her  is at the bedside.  Barely able to respond. 

Barely able to lift her head.  Severe generalized weakness but no focal 

deficits.)





Conclusion/Plan





- Problem List


(1) Hepatocellular carcinoma


Conclusion/Plan: 


Superimposed on hepatitis C cirrhosis, chronic anemia, ascites, hepatic encepha

lopathy.She is hypotensive, hypothermic.





Plan: We will honor her wishes.  She wishes to be comfort measures only against 

her 's desires.  He is reluctant but is currently in agreement with this.

 He is in the bed next to her being evaluated in the emergency room as well.








(2) Anemia


Conclusion/Plan: 


Initial type and cross was done.  At this point, because of transition to 

comfort measures at her request, we will not transfuse.


Qualifiers: 


   Anemia type: unspecified type   Qualified Code(s): D64.9 - Anemia, 

unspecified   





(3) Acute on chronic kidney failure


Conclusion/Plan: 


Presumed hepatorenal failure.  Again, this will be comfort measures only.  No IV

fluids.


Qualifiers: 


   Acute renal failure type: with acute tubular necrosis 





- Lab Results


Lab results reviewed: Yes


Fish Bones: 


                                 12/14/19 17:10





                                 12/14/19 17:10





Core Measures





- Anticipated LOS


I expect patient to be DC'd or transferred within 96 hours.: Yes





- DVT/VTE - Prophylaxis


VTE/DVT Device ordered at admit?: Yes

## 2019-12-14 NOTE — XRAY REPORT
Reason:  Altered, abnormal breath sounds

Procedure Date:  12/14/2019   

Accession Number:  [id number] / [id number]                    

Procedure:  XR  - Chest 1 View X-Ray CPT Code:  53290

 

***Final Report***

 

 

FULL RESULT:

 

 

EXAM:

CHEST RADIOGRAPHY

 

EXAM DATE: 12/14/2019 05:31 PM.

 

CLINICAL HISTORY: Altered, abnormal breath sounds.

 

COMPARISON: CHEST 2 VIEW 08/06/2019 3:46 PM

CHEST 2 VIEW 06/27/2019 11:30 AM.

 

TECHNIQUE: 1 view.

 

FINDINGS:

Lungs/Pleura: Lung volumes are somewhat low. There is no evidence of 

lobar consolidation or effusion. There is no pneumothorax.

 

Mediastinum: Borderline cardiomegaly. There is mild thoracic aortic 

calcification.

 

Other: None.

 

IMPRESSION:

1. Lung volumes are low.

2. No definite evidence of focal infiltrate. There is no effusion.

3. There is no pneumothorax.

 

RADIA

## 2019-12-14 NOTE — ED PHYSICIAN DOCUMENTATION
History of Present Illness





- Stated complaint


Stated Complaint: SOA/ WEAKNESS





- History obtained from


History obtained from: EMS (This is an 79-year-old woman who presents by 

ambulance.  She has a history of cirrhosis from hepatitis C from a remote 

transfusion.  She has been dwindling recently.  See my note from last week.  I 

spoke with her PCP Dolores Abraham shortly after arrival, she wants her to be in 

hospice but supposedly the  is a control freak and we will not allow her 

to be in hospice even though that is the patient's wishes.  I guess she had a 

mild fall at home today and presents by ambulance.  She is quite altered and 

basically cannot give any history.  Reportedly had a paracentesis yesterday.)





Review of Systems


Unable to obtain: Confused





PD PAST MEDICAL HISTORY





- Past Medical History


Cardiovascular: Hypertension


Respiratory: Other


Endocrine/Autoimmune: None


GI: Hepatitis, Cirrhosis, Cholelithiasis


GYN: Breast cancer


: None


Musculoskeletal: Fatigue


Derm: None





- Past Surgical History


Past Surgical History: Yes


Ortho: Hip replacement





- Allergies


Allergies/Adverse Reactions: 


                                    Allergies











Allergy/AdvReac Type Severity Reaction Status Date / Time


 


No Known Drug Allergies Allergy   Verified 12/05/19 11:20














- Social History


Does the pt smoke?: No


Smoking Status: Never smoker


Does the pt drink ETOH?: No


Does the pt have substance abuse?: No





- Immunizations


Immunizations are current?: Yes





PD ED PE NORMAL





- Vitals


Vital signs reviewed: Yes





- General


General: Other (She is somnolent, follows simple commands, oriented to person 

only.  She is very jaundiced.  Thin and frail.)





- HEENT


HEENT: PERRL, EOMI





- Neck


Neck: Supple, no meningeal sign, No bony TTP





- Cardiac


Cardiac: Other (Loud systolic murmur)





- Respiratory


Respiratory: Other (Diminished both bases right less than left)





- Abdomen


Abdomen: Other (Nontender but somewhat distended with ascites)





- Back


Back: No CVA TTP, No spinal TTP





- Derm


Derm: Other (Jaundiced)





- Extremities


Extremities: Other (Peripheral edema)





- Neuro


Neuro: Alert and oriented X 3, No motor deficit, No sensory deficit


Eye Opening: Spontaneous


Motor: Obeys Commands


Verbal: Confused


GCS Score: 14





Results





- Vitals


Vitals: 


                               Vital Signs - 24 hr











  12/14/19 12/14/19 12/14/19





  16:12 16:25 16:30


 


Temperature 33.0 C L  33.1 C L


 


Heart Rate 87 86 


 


Respiratory 26 H 28 H 





Rate   


 


Blood Pressure 99/60 99/60 


 


O2 Saturation 100 100 














  12/14/19 12/14/19 12/14/19





  16:32 16:49 17:00


 


Temperature  33.2 C L 


 


Heart Rate 84 84 


 


Respiratory 33 H 29 H 





Rate   


 


Blood Pressure 96/46 L 88/48 L 71/29 L


 


O2 Saturation 100 100 














  12/14/19 12/14/19 12/14/19





  17:21 17:26 17:30


 


Temperature 33.2 C L 33.2 C L 33.2 C L


 


Heart Rate 83 86 86


 


Respiratory 26 H 27 H 26 H





Rate   


 


Blood Pressure 87/30 L 87/30 L 92/32 L


 


O2 Saturation 100 93 96














  12/14/19 12/14/19





  17:45 17:58


 


Temperature 33.3 C L 33.4 C L


 


Heart Rate 87 90


 


Respiratory 26 H 23





Rate  


 


Blood Pressure 96/34 L 110/37 L


 


O2 Saturation 100 100








                                     Oxygen











O2 Source                      Room air

















- Labs


Labs: 


                                Laboratory Tests











  12/14/19 12/14/19 12/14/19





  17:10 17:10 17:10


 


WBC  10.6  


 


RBC  1.69 L  


 


Hgb  6.4 L*  


 


Hct  20.7 L  


 


MCV  122.5 H  


 


MCH  37.9 H  


 


MCHC  30.9 L  


 


RDW  19.4 H  


 


Plt Count  83 L  


 


MPV  12.2 H  


 


Neut # (Auto)  9.3 H  


 


Lymph # (Auto)  0.4 L  


 


Mono # (Auto)  0.7  


 


Eos # (Auto)  0.0  


 


Baso # (Auto)  0.0  


 


Absolute Nucleated RBC  0.00  


 


Nucleated RBC %  0.0  


 


Manual Slide Review  Indicated  


 


Platelet Estimate  DECREASED (<130,000)  


 


Platelet Morphology  NORMAL APPEARANCE  


 


RBC Morph Micro Appear  1+ SCHISTOCYTES  


 


PT   29.7 H 


 


INR   2.8 H 


 


Sodium    137


 


Potassium    5.9 H


 


Chloride    105


 


Carbon Dioxide    9 L*


 


Anion Gap    23.0 H


 


BUN    80 H*


 


Creatinine    2.9 H


 


Estimated GFR (MDRD)    16 L


 


Glucose    85


 


Lactic Acid   


 


Calcium    9.8


 


Total Bilirubin    16.7 H


 


AST    73 H


 


ALT    24


 


Alkaline Phosphatase    54


 


Ammonia   


 


Total Protein    5.3 L


 


Albumin    3.0 L


 


Globulin    2.3


 


Albumin/Globulin Ratio    1.3


 


Lipase    24


 


TSH   














  12/14/19 12/14/19 12/14/19





  17:10 17:10 17:10


 


WBC   


 


RBC   


 


Hgb   


 


Hct   


 


MCV   


 


MCH   


 


MCHC   


 


RDW   


 


Plt Count   


 


MPV   


 


Neut # (Auto)   


 


Lymph # (Auto)   


 


Mono # (Auto)   


 


Eos # (Auto)   


 


Baso # (Auto)   


 


Absolute Nucleated RBC   


 


Nucleated RBC %   


 


Manual Slide Review   


 


Platelet Estimate   


 


Platelet Morphology   


 


RBC Morph Micro Appear   


 


PT   


 


INR   


 


Sodium   


 


Potassium   


 


Chloride   


 


Carbon Dioxide   


 


Anion Gap   


 


BUN   


 


Creatinine   


 


Estimated GFR (MDRD)   


 


Glucose   


 


Lactic Acid   > 10.0 H* 


 


Calcium   


 


Total Bilirubin   


 


AST   


 


ALT   


 


Alkaline Phosphatase   


 


Ammonia  94.4 H*  


 


Total Protein   


 


Albumin   


 


Globulin   


 


Albumin/Globulin Ratio   


 


Lipase   


 


TSH    1.87














PD MEDICAL DECISION MAKING





- ED course


ED course: 





79-year-old woman with end-stage cirrhosis presents critically ill with altered 

mental status, jaundice, hypotension.  Also hypothermia.  Spoke with Dolores 

Jarad shortly after arrival, she wanted to put her in hospice and the patient

was agreeable but I guess the  was not.   arrived shortly 

thereafter.  We talked about goals of care, he does not seem ready to let go but

understands that she is dying and wants to focus on comfort.  As such I did not 

place a central line, we will not intubate.


We will scale back the measures as well, blood was readied.  She was on a little

bit of pressors, spoke with Dr. Barlow for admission at 6:15 PM.  Given the 

futile nature of the diagnosis and 's preference for comfort care we will

stop the pressors etc.





Departure





- Departure


Disposition: ED Place in Observation


Clinical Impression: 


 Shock, Comfort measures only status





Altered mental status


Qualifiers:


 Altered mental status type: delirium Qualified Code(s): R41.0 - Disorientation,

unspecified





Condition: Critical

## 2019-12-14 NOTE — DISCHARGE SUMMARY
Discharge Summary


Admit Date: 19


Discharge Date: 19


Discharging Provider: Jonel Jacobs


Primary Care Provider: Staci Abraham


Condition at Discharge: Critical


Discharge Disposition: 20 





- DIAGNOSES


Admission Diagnoses: 


Parasellar carcinoma


Anemia


Acute on chronic kidney failure





Discharge Diagnoses with Status of Each Condition: 


Hepatocellular carcinoma - worse.


Circulatory shock - worse.


Lactic acidosis - worse.


Anemia - worse.


Acute on chronic kidney failure - worse.


Liver cirrhosis - worse.


Hepatic encephalopathy - worse.


Hypothermia - worse.





- HPI


History of Present Illness: 


H&P per Dr. Barlow on 19:





This is a truly unfortunate sad 79-year-old female who has been down on the 

floor for a few hours at home.  She received a hip surgery in her home country 

of [facility] 30 years ago.  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  Was

treated with interferon and ribavirin initially, and then eventually Harvoni.  

Disease progressed.  She was then found to have hepatocellular carcinoma about 3

years ago.  She has not responded to therapy and has been failing.





In addition, her  has stage IV lung cancer and is also dying.  She has 

been requesting comfort care, even transition to hospice.  However, her  

has been described as "a control freak".  And he would not allow her to 

transition to hospice.  She has been coming in to get paracentesis for comfort, 

and blood transfusions as needed.





She fell on the floor today.  In an effort to help her up he ended up falling on

the floor as well.  They lay on the floor together for several hours until he 

can finally get to a phone and call EMS.





She was evaluated in the emergency room and was found to have a temperature of 

33, blood pressure of 99/60, 26 respiratory rates.  Her systolic is drifted down

to the high 40s and low 50s.  Levophed was started.  However, her primary care 

provider is present at the bedside.  Patient really does not want aggressive 

measures.  She really wants comfort measures only.   is now in the bed 

next to her being evaluated.





Her potassium is 5.9, carbon dioxide 9, lactic acid greater than 10, BUN 80, 

creatinine 2.9.  On  her BUN is 56 and creatinine 2.3.  Her ammonia 

level is 94.  Hemoglobin is back down to 6.4.  She had received 1 unit on 

 and her hemoglobin had gone up to 8.2.  Platelets are 83.  She is 

barely responsive.








- HOSPITAL COURSE


Hospital Course: 


She was admitted for comfort measures given her critical illness.  She was 

treated with morphine as needed as well as haldol, atropine drops, 

glycopyrrolate.  Her family was present at bedside throughout the 

hospitalization and were updated when she passed away at 2325 on 2019.








- ALLERGIES


Allergies/Adverse Reactions: 


                                    Allergies











Allergy/AdvReac Type Severity Reaction Status Date / Time


 


No Known Drug Allergies Allergy   Verified 19 11:20














- PHYSICAL EXAM AT DISCHARGE


Eyes Bilateral: positive: Other (Pupils dilated and fixed.  Not reactive to 

light.)


Respiratory: positive: Other (No breath sounds.)


Cardiovascular: positive: Other (No heart sounds present)


Peripheral Pulses: positive: 0





- LABS


Result Diagrams: 


                                 19 17:10





                                 19 17:10

## 2019-12-14 NOTE — DISCHARGE PLAN
Discharge Plan


Problem Reviewed?: Yes


Disposition: 20 


Condition: Critical


No Smoking: If you smoke, Please STOP!  Call [phone] for help.